# Patient Record
Sex: FEMALE | Race: WHITE | Employment: UNEMPLOYED | URBAN - METROPOLITAN AREA
[De-identification: names, ages, dates, MRNs, and addresses within clinical notes are randomized per-mention and may not be internally consistent; named-entity substitution may affect disease eponyms.]

---
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
## 2025-07-23 ENCOUNTER — OFFICE VISIT (OUTPATIENT)
Age: 20
End: 2025-07-23

## 2025-07-23 VITALS
TEMPERATURE: 97.2 F | SYSTOLIC BLOOD PRESSURE: 104 MMHG | DIASTOLIC BLOOD PRESSURE: 70 MMHG | OXYGEN SATURATION: 99 % | HEART RATE: 77 BPM

## 2025-07-23 DIAGNOSIS — L03.032 PARONYCHIA OF SECOND TOE OF LEFT FOOT: Primary | ICD-10-CM

## 2025-07-23 DIAGNOSIS — L60.0 INGROWN TOENAIL: ICD-10-CM

## 2025-07-23 DIAGNOSIS — L03.032 PARONYCHIA OF FOURTH TOE OF LEFT FOOT: ICD-10-CM

## 2025-07-23 RX ORDER — LIDOCAINE HYDROCHLORIDE 20 MG/ML
3 INJECTION, SOLUTION EPIDURAL; INFILTRATION; INTRACAUDAL; PERINEURAL ONCE
Status: SHIPPED | OUTPATIENT
Start: 2025-07-23

## 2025-07-23 NOTE — PROGRESS NOTES
Radha Sommer (:  2005) is a 19 y.o. female,  here for evaluation of the following chief complaint(s): Ingrown Toenail (Pt states infected ingrown toenail on left foot on little toe next to great toe/Pt states family hx of cellulitis so worried for that/Pt states noticed pus coming out of toe last night and had pain previous day)    Radha Sommer is a: New patient.   Last Urgent Care Visit: Visit date not found  I have reviewed the patient's medications and basic medical history; see Medication Reconciliation.    ASSESSMENT/PLAN:  Diagnosis:     ICD-10-CM    1. Paronychia of second toe of left foot  L03.032 amoxicillin-clavulanate (AUGMENTIN) 875-125 MG per tablet      2. Ingrown toenail  L60.0 lidocaine PF 2 % injection 3 mL      3. Paronychia of fourth toe of left foot  L03.032              Medical Decision Making:   Patient was seen at Urgent Care today for left 2nd toe redness, pain and tenderness x 2 days.   Exam is consistent with paronychia. There does appear to be mild ingrowing of the toe nail which likely led to the paronychia.     Digital block performed with 2% lidocaine without epi.  11 blade used to I&D paronychia abscess. Trace purulence obtained.   I did use forceps to pull out any ingrown nail and perform small wedge resection of the nail.   Pt advised topical antibiotic ointment.   Prescribed Augmentin for abx coverage.     Patient was discharged home with follow-up and return precautions.    Results:  No results found for any visits on 25.       SUBJECTIVE/OBJECTIVE:  HPI  This is a 19 y.o. female that presents today with complaint of increasing redness, tenderness and pain to the distal aspect of the left second toe near the medial nail edge.  Patient states she first noticed some redness and tenderness about 2 days ago.  Today it became increasingly swollen and tender.  There was some mild purulent discharge overnight.  She denies any known trauma.  No fever.  She is not

## 2025-07-23 NOTE — PATIENT INSTRUCTIONS
Topical antibiotic ointment daily.     Warm compresses.     Antibiotic as prescribed.     Return as needed.